# Patient Record
Sex: MALE | Race: WHITE | URBAN - METROPOLITAN AREA
[De-identification: names, ages, dates, MRNs, and addresses within clinical notes are randomized per-mention and may not be internally consistent; named-entity substitution may affect disease eponyms.]

---

## 2019-04-15 ENCOUNTER — NEW PATIENT (OUTPATIENT)
Dept: URBAN - METROPOLITAN AREA CLINIC 60 | Facility: CLINIC | Age: 44
End: 2019-04-15
Payer: COMMERCIAL

## 2019-04-15 DIAGNOSIS — H40.013 OPEN ANGLE WITH BORDERLINE FINDINGS, LOW RISK, BILATERAL: ICD-10-CM

## 2019-04-15 PROCEDURE — 92133 CPTRZD OPH DX IMG PST SGM ON: CPT | Performed by: OPTOMETRIST

## 2019-04-15 PROCEDURE — 92004 COMPRE OPH EXAM NEW PT 1/>: CPT | Performed by: OPTOMETRIST

## 2019-04-15 PROCEDURE — 76514 ECHO EXAM OF EYE THICKNESS: CPT | Performed by: OPTOMETRIST

## 2019-04-15 RX ORDER — MOXIFLOXACIN HYDROCHLORIDE 5 MG/ML
0.5 % SOLUTION/ DROPS OPHTHALMIC
Qty: 1 | Refills: 1 | Status: INACTIVE
Start: 2019-04-15 | End: 2019-04-29

## 2019-04-15 RX ORDER — NEPAFENAC 3 MG/ML
0.3 % SUSPENSION OPHTHALMIC
Qty: 1 | Refills: 1 | Status: INACTIVE
Start: 2019-04-15 | End: 2019-05-13

## 2019-04-15 RX ORDER — PREDNISOLONE ACETATE 10 MG/ML
1 % SUSPENSION/ DROPS OPHTHALMIC
Qty: 1 | Refills: 1 | Status: INACTIVE
Start: 2019-04-15 | End: 2020-01-17

## 2019-04-15 ASSESSMENT — VISUAL ACUITY
OS: 20/20
OD: 20/40

## 2019-04-15 ASSESSMENT — INTRAOCULAR PRESSURE
OS: 16
OD: 15

## 2019-05-03 ENCOUNTER — NEW PATIENT (OUTPATIENT)
Dept: URBAN - METROPOLITAN AREA CLINIC 60 | Facility: CLINIC | Age: 44
End: 2019-05-03
Payer: COMMERCIAL

## 2019-05-03 DIAGNOSIS — H25.813 COMBINED FORMS OF AGE-RELATED CATARACT, BILATERAL: Primary | ICD-10-CM

## 2019-05-03 PROCEDURE — 92004 COMPRE OPH EXAM NEW PT 1/>: CPT | Performed by: OPHTHALMOLOGY

## 2019-05-03 ASSESSMENT — VISUAL ACUITY
OD: 20/40
OS: 20/20

## 2019-05-03 ASSESSMENT — INTRAOCULAR PRESSURE
OD: 12
OS: 13

## 2019-05-07 ENCOUNTER — Encounter (OUTPATIENT)
Dept: URBAN - METROPOLITAN AREA CLINIC 60 | Facility: CLINIC | Age: 44
End: 2019-05-07
Payer: COMMERCIAL

## 2019-05-07 DIAGNOSIS — Z01.818 ENCOUNTER FOR OTHER PREPROCEDURAL EXAMINATION: Primary | ICD-10-CM

## 2019-05-07 PROCEDURE — 99213 OFFICE O/P EST LOW 20 MIN: CPT | Performed by: PHYSICIAN ASSISTANT

## 2019-05-15 ENCOUNTER — SURGERY (OUTPATIENT)
Dept: URBAN - METROPOLITAN AREA SURGERY 36 | Facility: SURGERY | Age: 44
End: 2019-05-15
Payer: COMMERCIAL

## 2019-05-15 PROCEDURE — 66984 XCAPSL CTRC RMVL W/O ECP: CPT | Performed by: OPHTHALMOLOGY

## 2019-05-16 ENCOUNTER — POST OP (OUTPATIENT)
Dept: URBAN - METROPOLITAN AREA CLINIC 60 | Facility: CLINIC | Age: 44
End: 2019-05-16

## 2019-05-16 PROCEDURE — 99024 POSTOP FOLLOW-UP VISIT: CPT | Performed by: OPTOMETRIST

## 2019-05-16 ASSESSMENT — INTRAOCULAR PRESSURE: OD: 14

## 2019-05-21 ENCOUNTER — POST OP (OUTPATIENT)
Dept: URBAN - METROPOLITAN AREA CLINIC 60 | Facility: CLINIC | Age: 44
End: 2019-05-21

## 2019-05-21 PROCEDURE — 99024 POSTOP FOLLOW-UP VISIT: CPT | Performed by: OPTOMETRIST

## 2019-05-21 RX ORDER — MOXIFLOXACIN HYDROCHLORIDE 5 MG/ML
0.5 % SOLUTION/ DROPS OPHTHALMIC
Qty: 1 | Refills: 1 | Status: INACTIVE
Start: 2019-05-21 | End: 2019-06-05

## 2019-05-21 ASSESSMENT — INTRAOCULAR PRESSURE
OD: 14
OS: 14

## 2019-05-21 ASSESSMENT — VISUAL ACUITY
OS: 20/20
OD: 20/20

## 2019-06-05 ENCOUNTER — POST OP (OUTPATIENT)
Dept: URBAN - METROPOLITAN AREA CLINIC 60 | Facility: CLINIC | Age: 44
End: 2019-06-05

## 2019-06-05 PROCEDURE — 99024 POSTOP FOLLOW-UP VISIT: CPT | Performed by: OPTOMETRIST

## 2019-06-05 RX ORDER — CARBOXYMETHYLCELLULOSE SODIUM AND GLYCERIN 5; 9 MG/ML; MG/ML
SOLUTION/ DROPS OPHTHALMIC
Refills: 0 | Status: INACTIVE
Start: 2019-06-05 | End: 2020-01-17

## 2019-06-05 ASSESSMENT — VISUAL ACUITY
OD: 20/20
OS: 20/20

## 2019-06-05 ASSESSMENT — INTRAOCULAR PRESSURE
OS: 11
OD: 11

## 2020-01-17 ENCOUNTER — FOLLOW UP ESTABLISHED (OUTPATIENT)
Dept: URBAN - METROPOLITAN AREA CLINIC 59 | Facility: CLINIC | Age: 45
End: 2020-01-17
Payer: COMMERCIAL

## 2020-01-17 PROCEDURE — 92012 INTRM OPH EXAM EST PATIENT: CPT | Performed by: OPTOMETRIST

## 2020-01-17 PROCEDURE — 92083 EXTENDED VISUAL FIELD XM: CPT | Performed by: OPTOMETRIST

## 2020-01-17 ASSESSMENT — INTRAOCULAR PRESSURE
OD: 19
OS: 17

## 2020-05-19 ENCOUNTER — FOLLOW UP ESTABLISHED (OUTPATIENT)
Dept: URBAN - METROPOLITAN AREA CLINIC 60 | Facility: CLINIC | Age: 45
End: 2020-05-19
Payer: COMMERCIAL

## 2020-05-19 DIAGNOSIS — Z98.890 OTHER SPECIFIED POSTPROCEDURAL STATES: ICD-10-CM

## 2020-05-19 PROCEDURE — 92250 FUNDUS PHOTOGRAPHY W/I&R: CPT | Performed by: OPTOMETRIST

## 2020-05-19 PROCEDURE — 92014 COMPRE OPH EXAM EST PT 1/>: CPT | Performed by: OPTOMETRIST

## 2020-05-19 ASSESSMENT — INTRAOCULAR PRESSURE
OS: 13
OD: 13

## 2020-11-19 ENCOUNTER — FOLLOW UP ESTABLISHED (OUTPATIENT)
Dept: URBAN - METROPOLITAN AREA CLINIC 59 | Facility: CLINIC | Age: 45
End: 2020-11-19
Payer: COMMERCIAL

## 2020-11-19 PROCEDURE — 92083 EXTENDED VISUAL FIELD XM: CPT | Performed by: OPTOMETRIST

## 2020-11-19 PROCEDURE — 92012 INTRM OPH EXAM EST PATIENT: CPT | Performed by: OPTOMETRIST

## 2020-11-19 PROCEDURE — 92133 CPTRZD OPH DX IMG PST SGM ON: CPT | Performed by: OPTOMETRIST

## 2020-11-19 ASSESSMENT — INTRAOCULAR PRESSURE
OS: 17
OD: 19

## 2021-10-11 ENCOUNTER — OFFICE VISIT (OUTPATIENT)
Dept: URBAN - METROPOLITAN AREA CLINIC 63 | Facility: CLINIC | Age: 46
End: 2021-10-11
Payer: COMMERCIAL

## 2021-10-11 DIAGNOSIS — Z96.1 PRESENCE OF INTRAOCULAR LENS: ICD-10-CM

## 2021-10-11 DIAGNOSIS — H25.812 COMBINED FORMS OF AGE-RELATED CATARACT, LEFT EYE: Primary | ICD-10-CM

## 2021-10-11 DIAGNOSIS — H31.092 OTHER CHORIORETINAL SCARS, LEFT EYE: ICD-10-CM

## 2021-10-11 DIAGNOSIS — H43.813 VITREOUS DEGENERATION, BILATERAL: ICD-10-CM

## 2021-10-11 PROCEDURE — 99214 OFFICE O/P EST MOD 30 MIN: CPT | Performed by: OPTOMETRIST

## 2021-10-11 ASSESSMENT — INTRAOCULAR PRESSURE
OS: 12
OD: 11

## 2021-10-11 ASSESSMENT — VISUAL ACUITY: OS: 20/20

## 2021-10-18 ENCOUNTER — OFFICE VISIT (OUTPATIENT)
Dept: URBAN - METROPOLITAN AREA CLINIC 63 | Facility: CLINIC | Age: 46
End: 2021-10-18
Payer: COMMERCIAL

## 2021-10-18 DIAGNOSIS — H52.4 PRESBYOPIA: Primary | ICD-10-CM

## 2021-10-18 PROCEDURE — 92012 INTRM OPH EXAM EST PATIENT: CPT | Performed by: OPTOMETRIST

## 2021-10-18 ASSESSMENT — KERATOMETRY
OS: 37.25
OD: 37.50

## 2021-10-18 ASSESSMENT — INTRAOCULAR PRESSURE
OS: 14
OD: 14

## 2021-10-18 ASSESSMENT — VISUAL ACUITY: OS: 20/20

## 2021-10-18 NOTE — IMPRESSION/PLAN
Impression: Presbyopia: H52.4. Plan: Gave Rx for specs with +2.00 ADD and instructed on adaptation period, BFs, wear time.

## 2022-10-12 ENCOUNTER — OFFICE VISIT (OUTPATIENT)
Dept: URBAN - METROPOLITAN AREA CLINIC 63 | Facility: CLINIC | Age: 47
End: 2022-10-12
Payer: COMMERCIAL

## 2022-10-12 DIAGNOSIS — Z96.1 PRESENCE OF INTRAOCULAR LENS: ICD-10-CM

## 2022-10-12 DIAGNOSIS — H31.092 OTHER CHORIORETINAL SCARS, LEFT EYE: ICD-10-CM

## 2022-10-12 DIAGNOSIS — H40.013 OPEN ANGLE WITH BORDERLINE FINDINGS, LOW RISK, BILATERAL: ICD-10-CM

## 2022-10-12 DIAGNOSIS — H25.812 COMBINED FORMS OF AGE-RELATED CATARACT, LEFT EYE: Primary | ICD-10-CM

## 2022-10-12 DIAGNOSIS — H43.813 VITREOUS DEGENERATION, BILATERAL: ICD-10-CM

## 2022-10-12 PROCEDURE — 92014 COMPRE OPH EXAM EST PT 1/>: CPT | Performed by: OPTOMETRIST

## 2022-10-12 ASSESSMENT — VISUAL ACUITY
OD: 20/20
OS: 20/25

## 2022-10-12 ASSESSMENT — KERATOMETRY
OS: 37.35
OD: 37.63

## 2022-10-12 ASSESSMENT — INTRAOCULAR PRESSURE
OS: 14
OD: 15

## 2022-10-12 NOTE — IMPRESSION/PLAN
Impression: Open angle with borderline findings, low risk, bilateral: H40.013. Plan: Glaucoma suspect based on large ONH C/Ds. IOP stable, no changes to Talha Mccray 75 OU. Continue with no gtt use. Monitor yearly.

## 2022-10-19 ENCOUNTER — OFFICE VISIT (OUTPATIENT)
Dept: URBAN - METROPOLITAN AREA CLINIC 63 | Facility: CLINIC | Age: 47
End: 2022-10-19
Payer: COMMERCIAL

## 2022-10-19 DIAGNOSIS — H52.4 PRESBYOPIA: Primary | ICD-10-CM

## 2022-10-19 PROCEDURE — 92014 COMPRE OPH EXAM EST PT 1/>: CPT | Performed by: OPTOMETRIST

## 2022-10-19 ASSESSMENT — VISUAL ACUITY
OD: 20/20
OS: 20/25

## 2022-10-19 ASSESSMENT — KERATOMETRY
OS: 37.38
OD: 37.63

## 2022-10-19 NOTE — IMPRESSION/PLAN
Impression: Presbyopia: H52.4. Plan: Gave Rx for specs and instructed on adaptation period, wear time. Continual PAL or Computer/near PAL given.

## 2023-07-31 ENCOUNTER — OFFICE VISIT (OUTPATIENT)
Dept: URBAN - METROPOLITAN AREA CLINIC 63 | Facility: CLINIC | Age: 48
End: 2023-07-31
Payer: COMMERCIAL

## 2023-07-31 DIAGNOSIS — H43.813 VITREOUS DEGENERATION, BILATERAL: Primary | ICD-10-CM

## 2023-07-31 DIAGNOSIS — Z96.1 PRESENCE OF INTRAOCULAR LENS: ICD-10-CM

## 2023-07-31 DIAGNOSIS — H25.812 COMBINED FORMS OF AGE-RELATED CATARACT, LEFT EYE: ICD-10-CM

## 2023-07-31 PROCEDURE — 92014 COMPRE OPH EXAM EST PT 1/>: CPT | Performed by: OPTOMETRIST

## 2023-07-31 ASSESSMENT — INTRAOCULAR PRESSURE
OS: 13
OD: 14

## 2023-10-09 ENCOUNTER — OFFICE VISIT (OUTPATIENT)
Dept: URBAN - METROPOLITAN AREA CLINIC 60 | Facility: CLINIC | Age: 48
End: 2023-10-09
Payer: COMMERCIAL

## 2023-10-09 DIAGNOSIS — H52.13 MYOPIA, BILATERAL: Primary | ICD-10-CM

## 2023-10-09 PROCEDURE — 92014 COMPRE OPH EXAM EST PT 1/>: CPT | Performed by: OPTOMETRIST

## 2023-10-09 ASSESSMENT — VISUAL ACUITY
OD: 20/20
OS: 20/20

## 2024-10-17 ENCOUNTER — OFFICE VISIT (OUTPATIENT)
Dept: URBAN - METROPOLITAN AREA CLINIC 63 | Facility: CLINIC | Age: 49
End: 2024-10-17
Payer: COMMERCIAL

## 2024-10-17 DIAGNOSIS — H25.812 COMBINED FORMS OF AGE-RELATED CATARACT, LEFT EYE: ICD-10-CM

## 2024-10-17 DIAGNOSIS — H26.491 OTHER SECONDARY CATARACT, RIGHT EYE: ICD-10-CM

## 2024-10-17 DIAGNOSIS — H04.123 DRY EYE SYNDROME OF BILATERAL LACRIMAL GLANDS: ICD-10-CM

## 2024-10-17 DIAGNOSIS — H52.13 MYOPIA, BILATERAL: Primary | ICD-10-CM

## 2024-10-17 PROCEDURE — 92012 INTRM OPH EXAM EST PATIENT: CPT | Performed by: OPTOMETRIST

## 2024-10-17 ASSESSMENT — VISUAL ACUITY
OD: 20/20
OS: 20/30

## 2024-10-17 ASSESSMENT — INTRAOCULAR PRESSURE
OS: 13
OD: 13

## 2024-10-17 ASSESSMENT — KERATOMETRY
OD: 37.38
OS: 37.38